# Patient Record
Sex: FEMALE | Race: WHITE | Employment: FULL TIME | URBAN - METROPOLITAN AREA
[De-identification: names, ages, dates, MRNs, and addresses within clinical notes are randomized per-mention and may not be internally consistent; named-entity substitution may affect disease eponyms.]

---
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
## 2023-01-13 ENCOUNTER — OFFICE VISIT (OUTPATIENT)
Dept: PRIMARY CARE CLINIC | Age: 18
End: 2023-01-13
Payer: COMMERCIAL

## 2023-01-13 VITALS
WEIGHT: 130 LBS | OXYGEN SATURATION: 100 % | HEIGHT: 65 IN | SYSTOLIC BLOOD PRESSURE: 108 MMHG | HEART RATE: 60 BPM | DIASTOLIC BLOOD PRESSURE: 66 MMHG | BODY MASS INDEX: 21.66 KG/M2

## 2023-01-13 DIAGNOSIS — L70.9 ACNE, UNSPECIFIED ACNE TYPE: ICD-10-CM

## 2023-01-13 DIAGNOSIS — N91.2 AMENORRHEA: Primary | ICD-10-CM

## 2023-01-13 PROCEDURE — 99214 OFFICE O/P EST MOD 30 MIN: CPT | Performed by: PHYSICIAN ASSISTANT

## 2023-01-13 RX ORDER — CLINDAMYCIN PHOSPHATE 11.9 MG/ML
SOLUTION TOPICAL
Qty: 30 ML | Refills: 0 | Status: SHIPPED | OUTPATIENT
Start: 2023-01-13 | End: 2023-02-12

## 2023-01-13 RX ORDER — BUSPIRONE HYDROCHLORIDE 5 MG/1
5 TABLET ORAL 2 TIMES DAILY
COMMUNITY

## 2023-01-13 ASSESSMENT — PATIENT HEALTH QUESTIONNAIRE - PHQ9
SUM OF ALL RESPONSES TO PHQ QUESTIONS 1-9: 0
SUM OF ALL RESPONSES TO PHQ9 QUESTIONS 1 & 2: 0
1. LITTLE INTEREST OR PLEASURE IN DOING THINGS: 0
SUM OF ALL RESPONSES TO PHQ QUESTIONS 1-9: 0
2. FEELING DOWN, DEPRESSED OR HOPELESS: 0

## 2023-01-13 ASSESSMENT — ENCOUNTER SYMPTOMS
ABDOMINAL PAIN: 0
VOMITING: 0
WHEEZING: 0
COUGH: 0
DIARRHEA: 0
NAUSEA: 0

## 2023-01-13 NOTE — PROGRESS NOTES
[address] [facility] PRIMARY CARE  [address] [facility] [address]  Dept: 2001 Salvador Garcia is a 21 y.o. female Established patient, who presents today for her medical conditions/complaints as noted below. Chief Complaint   Patient presents with    Other     Wants labs for hormones and pcos        HPI:     HPI: The patient is here to go over possible blood work. Has been having breakouts. Has missed her period the last two months. Has been 75 days since last cycle. Still getting PMS symptoms. No birth control. Starting college made some stress. No sexual activity. Does not eat gluten or dairy due to IBS. She is still eating it. Has gained weight. She did stop running. No spotting. Some nausea. No fevers no discharge. No Ob/gyn. No family history of these symptoms. Reviewed prior notes None  Reviewed previous Labs    No results found for: LDLCHOLESTEROL, LDLCALC    (goal LDL is <100)   TSH (mIU/L)   Date Value   09/02/2020 1.51     BP Readings from Last 3 Encounters:   01/13/23 108/66   11/17/21 110/68   10/27/21 114/76          (goal 120/80)  No results found for: LABA1C  No past medical history on file. No past surgical history on file. Family History   Problem Relation Age of Onset    Other Maternal Grandmother         hypotension    Hypothyroidism Maternal Grandmother        Social History     Tobacco Use    Smoking status: Never    Smokeless tobacco: Never   Substance Use Topics    Alcohol use: No      Current Outpatient Medications   Medication Sig Dispense Refill    busPIRone (BUSPAR) 5 MG tablet Take 5 mg by mouth 2 times daily      clindamycin (CLEOCIN-T) 1 % external solution Apply topically 2 times daily. 30 mL 0    albuterol sulfate HFA (VENTOLIN HFA) 108 (90 Base) MCG/ACT inhaler Inhale 2 puffs into the lungs 4 times daily as needed for Wheezing 1 Inhaler 0     No current facility-administered medications for this visit.      Allergies Allergen Reactions    Bee Pollen     Pollen Extract        Health Maintenance   Topic Date Due    COVID-19 Vaccine (1) Never done    Varicella vaccine (2 of 2 - 2-dose childhood series) 09/26/2007    HPV vaccine (1 - 2-dose series) Never done    HIV screen  Never done    Chlamydia/GC screen  Never done    Flu vaccine (1) Never done    Depression Screen  01/13/2024    DTaP/Tdap/Td vaccine (7 - Td or Tdap) 07/15/2025    Hib vaccine  Completed    Meningococcal (ACWY) vaccine  Completed    Pneumococcal 0-64 years Vaccine  Completed    Hepatitis C screen  Completed    Hepatitis A vaccine  Aged Out       Subjective:      Review of Systems   Constitutional:  Negative for chills and fever. Respiratory:  Negative for cough and wheezing. Cardiovascular:  Negative for chest pain and palpitations. Gastrointestinal:  Negative for abdominal pain, diarrhea, nausea and vomiting. Genitourinary:  Positive for menstrual problem and vaginal bleeding. Negative for flank pain, hematuria and vaginal discharge. Skin:  Negative for rash. Neurological:  Negative for light-headedness and headaches. Psychiatric/Behavioral:  Negative for dysphoric mood and sleep disturbance. The patient is not nervous/anxious. Objective:     /66   Pulse 60   Ht 5' 4.57\" (1.64 m)   Wt 130 lb (59 kg)   SpO2 100%   BMI 21.92 kg/m²   Physical Exam  Constitutional:       General: She is not in acute distress. Appearance: Normal appearance. She is not ill-appearing. HENT:      Head: Normocephalic and atraumatic. Right Ear: External ear normal.      Left Ear: External ear normal.      Nose: Nose normal.      Mouth/Throat:      Mouth: Mucous membranes are moist.   Neck:      Vascular: No carotid bruit. Cardiovascular:      Rate and Rhythm: Normal rate and regular rhythm. Pulses: Normal pulses. Heart sounds: Normal heart sounds. Pulmonary:      Effort: Pulmonary effort is normal. No respiratory distress. Breath sounds: Normal breath sounds. Abdominal:      General: There is no distension. Palpations: There is no mass. Tenderness: There is no abdominal tenderness. There is no guarding. Musculoskeletal:      Cervical back: Normal range of motion and neck supple. Lymphadenopathy:      Cervical: No cervical adenopathy. Skin:     Findings: Rash (acne) present. Neurological:      Mental Status: She is alert. Assessment and Plan:          1. Amenorrhea  -     Estradiol; Future  -     Estrone; Future  -     Estrone; Future  -     Testosterone Free Bio Total; Future  -     Progesterone; Future  -     Sex Hormone Binding Globulin; Future  -     Vitamin D 25 Hydroxy; Future  -     DHEA-Sulfate; Future  -     HIV Screen; Future  -     TSH With Reflex Ft4; Future  -     hCG, Quantitative, Pregnancy; Future  -     US PELVIS COMPLETE; Future  -     Cortisol Total; Future  2. Acne, unspecified acne type  -     clindamycin (CLEOCIN-T) 1 % external solution; Apply topically 2 times daily. , Disp-30 mL, R-0, Normal           No follow-ups on file. Patient given educational materials - see patient instructions. Discussed use, benefit, and side effects of prescribed medications. All patient questions answered. Pt voiced understanding. Reviewed health maintenance. Instructed to continue current medications, diet and exercise. Patient agreed with treatment plan. Follow up as directed.      Electronically signed by SABRA Rm on 1/13/2023 at 2:31 PM

## 2023-01-14 ENCOUNTER — HOSPITAL ENCOUNTER (OUTPATIENT)
Age: 18
Discharge: HOME | End: 2023-01-14
Payer: COMMERCIAL

## 2023-01-14 DIAGNOSIS — N91.2 AMENORRHEA: ICD-10-CM

## 2023-01-14 LAB
CORTISOL COLLECTION INFO: NORMAL
CORTISOL: 10.2 UG/DL (ref 2.7–18.4)
ESTRADIOL LEVEL: 128.2 PG/ML (ref 27–314)
HCG QUANTITATIVE: <1 MIU/ML
HIV AG/AB: NONREACTIVE
PROGESTERONE LEVEL: 8.21 NG/ML
SEX HORMONE BINDING GLOBULIN: 45 NMOL/L (ref 30–135)
TESTOSTERONE FREE-NONMALE: 10.6 PG/ML (ref 0.8–7.4)
TESTOSTERONE TOTAL: 71 NG/DL (ref 20–70)
TESTOSTERONE, BIOAVAILABLE: 24.9 NG/DL (ref 2.2–20.6)
TSH SERPL DL<=0.05 MIU/L-ACNC: 1.94 UIU/ML (ref 0.3–5)
VITAMIN D 25-HYDROXY: 45.4 NG/ML

## 2023-01-14 PROCEDURE — 82670 ASSAY OF TOTAL ESTRADIOL: CPT

## 2023-01-14 PROCEDURE — 84144 ASSAY OF PROGESTERONE: CPT

## 2023-01-14 PROCEDURE — 84443 ASSAY THYROID STIM HORMONE: CPT

## 2023-01-14 PROCEDURE — 36415 COLL VENOUS BLD VENIPUNCTURE: CPT

## 2023-01-14 PROCEDURE — 82679 ASSAY OF ESTRONE: CPT

## 2023-01-14 PROCEDURE — 82306 VITAMIN D 25 HYDROXY: CPT

## 2023-01-14 PROCEDURE — 82533 TOTAL CORTISOL: CPT

## 2023-01-14 PROCEDURE — 84270 ASSAY OF SEX HORMONE GLOBUL: CPT

## 2023-01-14 PROCEDURE — 84403 ASSAY OF TOTAL TESTOSTERONE: CPT

## 2023-01-14 PROCEDURE — 87389 HIV-1 AG W/HIV-1&-2 AB AG IA: CPT

## 2023-01-14 PROCEDURE — 84702 CHORIONIC GONADOTROPIN TEST: CPT

## 2023-01-14 PROCEDURE — 82627 DEHYDROEPIANDROSTERONE: CPT

## 2023-01-17 ENCOUNTER — TELEPHONE (OUTPATIENT)
Dept: PRIMARY CARE CLINIC | Age: 18
End: 2023-01-17

## 2023-01-17 ENCOUNTER — HOSPITAL ENCOUNTER (OUTPATIENT)
Dept: ULTRASOUND IMAGING | Age: 18
Discharge: HOME | End: 2023-01-19
Payer: COMMERCIAL

## 2023-01-17 DIAGNOSIS — N91.2 AMENORRHEA: ICD-10-CM

## 2023-01-17 PROCEDURE — 76856 US EXAM PELVIC COMPLETE: CPT

## 2023-01-17 NOTE — TELEPHONE ENCOUNTER
Pt's Mom Josiah Night (on hippa) calling for lab results. Scanned in under labs, 1 lab is still in process. Pt will be getting her pelvic US done tonight.